# Patient Record
Sex: FEMALE | URBAN - METROPOLITAN AREA
[De-identification: names, ages, dates, MRNs, and addresses within clinical notes are randomized per-mention and may not be internally consistent; named-entity substitution may affect disease eponyms.]

---

## 2023-02-13 ENCOUNTER — HOSPITAL ENCOUNTER (EMERGENCY)
Facility: HOSPITAL | Age: 28
Discharge: HOME | End: 2023-02-13
Attending: STUDENT IN AN ORGANIZED HEALTH CARE EDUCATION/TRAINING PROGRAM

## 2023-02-13 VITALS
OXYGEN SATURATION: 100 % | DIASTOLIC BLOOD PRESSURE: 61 MMHG | TEMPERATURE: 98 F | WEIGHT: 140 LBS | HEIGHT: 63 IN | HEART RATE: 70 BPM | RESPIRATION RATE: 16 BRPM | SYSTOLIC BLOOD PRESSURE: 101 MMHG | BODY MASS INDEX: 24.8 KG/M2

## 2023-02-13 DIAGNOSIS — Z77.098 CHEMICAL EXPOSURE: Primary | ICD-10-CM

## 2023-02-13 DIAGNOSIS — R55 NEAR SYNCOPE: ICD-10-CM

## 2023-02-13 PROCEDURE — 99282 EMERGENCY DEPT VISIT SF MDM: CPT | Mod: 25

## 2023-02-13 NOTE — ED PROVIDER NOTES
Encounter Date: 2/13/2023       History     Chief Complaint   Patient presents with    Chemical Exposure     The history is provided by the patient. The history is limited by a language barrier. A  was used.     27-year-old female with no known past medical history presents emergency department after passing out at work.  Patient states that she was using Clorox to clean tablets when she started getting dizzy from the smell.  She states she then seen some blood on the floor which made her pass out.  Patient states that she now feels perfectly back to normal.  She denies having any headache, vision changes or nausea vomiting.  Patient states that she thought she lost consciousness but her friend who is here in the emergency department states that she never completely passed out.    Review of patient's allergies indicates:  No Known Allergies  No past medical history on file.  No past surgical history on file.  No family history on file.     Review of Systems   Constitutional:  Positive for fatigue. Negative for fever.   Respiratory:  Negative for cough and shortness of breath.    Cardiovascular:  Negative for chest pain.   Gastrointestinal:  Negative for abdominal pain.   Neurological:  Positive for light-headedness.   All other systems reviewed and are negative.    Physical Exam     Initial Vitals [02/13/23 1140]   BP Pulse Resp Temp SpO2   107/73 68 20 98.2 °F (36.8 °C) 99 %      MAP       --         Physical Exam    Nursing note and vitals reviewed.  Constitutional: She appears well-developed and well-nourished. No distress.   Cardiovascular:  Normal rate and regular rhythm.           Pulmonary/Chest: Breath sounds normal. No respiratory distress.   Abdominal: Abdomen is soft. Bowel sounds are normal. There is no abdominal tenderness.   Musculoskeletal:         General: No tenderness. Normal range of motion.     Neurological: She is alert and oriented to person, place, and time.   Skin: Skin is  warm. Capillary refill takes less than 2 seconds.   Psychiatric: She has a normal mood and affect. Thought content normal.       ED Course   Procedures  Labs Reviewed - No data to display       Imaging Results    None          Medications - No data to display  Medical Decision Making:   Differential Diagnosis:   Chemical exposure, vasovagal, near syncope  ED Management:  Patient adamantly denies missing any chemicals.  No indication for any blood work.  Will monitor patient to make sure she stays satting well with no changes in neurological status.   Medical Decision Making  Problems Addressed:  Chemical exposure: undiagnosed new problem with uncertain prognosis  Near syncope: self-limited or minor problem    Amount and/or Complexity of Data Reviewed  Independent Historian: friend and EMS    Risk  OTC drugs.              ED Course as of 02/13/23 1211   Mon Feb 13, 2023   1210 Patient resting comfortably in bed in no acute distress.  Vital signs stable.  Patient requesting to be discharged.  Will discharge.  ER precautions given. [BS]      ED Course User Index  [BS] Keaton Patten MD                 Clinical Impression:   Final diagnoses:  [Z77.098] Chemical exposure (Primary)  [R55] Near syncope        ED Disposition Condition    Discharge Stable          ED Prescriptions    None       Follow-up Information       Follow up With Specialties Details Why Contact Info    [facility] - Emergency Dept Emergency Medicine Go to  If symptoms worsen [id number]  [facility] [id number] [phone]             Keaton Patten MD  02/13/23 1211